# Patient Record
Sex: FEMALE | Race: WHITE
[De-identification: names, ages, dates, MRNs, and addresses within clinical notes are randomized per-mention and may not be internally consistent; named-entity substitution may affect disease eponyms.]

---

## 2019-10-23 ENCOUNTER — HOSPITAL ENCOUNTER (OUTPATIENT)
Dept: HOSPITAL 53 - M OPP | Age: 64
Discharge: HOME | End: 2019-10-23
Attending: INTERNAL MEDICINE
Payer: COMMERCIAL

## 2019-10-23 VITALS — WEIGHT: 177 LBS | BODY MASS INDEX: 30.22 KG/M2 | HEIGHT: 64 IN

## 2019-10-23 VITALS — DIASTOLIC BLOOD PRESSURE: 90 MMHG | SYSTOLIC BLOOD PRESSURE: 143 MMHG

## 2019-10-23 DIAGNOSIS — K64.0: ICD-10-CM

## 2019-10-23 DIAGNOSIS — Z12.11: Primary | ICD-10-CM

## 2019-10-23 DIAGNOSIS — K57.30: ICD-10-CM

## 2019-10-23 DIAGNOSIS — Z79.891: ICD-10-CM

## 2019-10-23 DIAGNOSIS — Z79.899: ICD-10-CM

## 2019-10-23 DIAGNOSIS — I48.91: ICD-10-CM

## 2019-10-23 DIAGNOSIS — R13.10: ICD-10-CM

## 2020-06-29 ENCOUNTER — HOSPITAL ENCOUNTER (OUTPATIENT)
Dept: HOSPITAL 53 - M ADAMS | Age: 65
End: 2020-06-29
Attending: NURSE PRACTITIONER
Payer: OTHER GOVERNMENT

## 2020-06-29 DIAGNOSIS — R06.02: Primary | ICD-10-CM

## 2022-05-17 ENCOUNTER — APPOINTMENT (EMERGENCY)
Dept: CT IMAGING | Facility: HOSPITAL | Age: 67
End: 2022-05-17
Payer: MEDICARE

## 2022-05-17 ENCOUNTER — APPOINTMENT (EMERGENCY)
Dept: RADIOLOGY | Facility: HOSPITAL | Age: 67
End: 2022-05-17
Payer: MEDICARE

## 2022-05-17 ENCOUNTER — HOSPITAL ENCOUNTER (OUTPATIENT)
Facility: HOSPITAL | Age: 67
Setting detail: OBSERVATION
Discharge: HOME | End: 2022-05-18
Attending: EMERGENCY MEDICINE | Admitting: STUDENT IN AN ORGANIZED HEALTH CARE EDUCATION/TRAINING PROGRAM
Payer: MEDICARE

## 2022-05-17 DIAGNOSIS — R07.9 CHEST PAIN: Primary | ICD-10-CM

## 2022-05-17 PROBLEM — E87.1 HYPONATREMIA: Status: ACTIVE | Noted: 2022-05-17

## 2022-05-17 PROBLEM — F41.9 ANXIETY: Status: ACTIVE | Noted: 2022-05-17

## 2022-05-17 PROBLEM — I10 HYPERTENSION: Status: ACTIVE | Noted: 2022-05-17

## 2022-05-17 PROBLEM — E78.5 HYPERLIPIDEMIA: Status: ACTIVE | Noted: 2022-05-17

## 2022-05-17 LAB
ALBUMIN SERPL BCP-MCNC: 3.9 G/DL (ref 3.5–5)
ALP SERPL-CCNC: 100 U/L (ref 46–116)
ALT SERPL W P-5'-P-CCNC: 25 U/L (ref 12–78)
ANION GAP SERPL CALCULATED.3IONS-SCNC: 9 MMOL/L (ref 4–13)
AST SERPL W P-5'-P-CCNC: 26 U/L (ref 5–45)
ATRIAL RATE: 89 BPM
ATRIAL RATE: 90 BPM
ATRIAL RATE: 92 BPM
BASOPHILS # BLD AUTO: 0.03 THOUSANDS/ΜL (ref 0–0.1)
BASOPHILS NFR BLD AUTO: 0 % (ref 0–1)
BILIRUB DIRECT SERPL-MCNC: 0.08 MG/DL (ref 0–0.2)
BILIRUB SERPL-MCNC: 0.31 MG/DL (ref 0.2–1)
BUN SERPL-MCNC: 16 MG/DL (ref 5–25)
CALCIUM SERPL-MCNC: 9 MG/DL (ref 8.3–10.1)
CARDIAC TROPONIN I PNL SERPL HS: <2 NG/L
CHLORIDE SERPL-SCNC: 101 MMOL/L (ref 100–108)
CO2 SERPL-SCNC: 25 MMOL/L (ref 21–32)
CREAT SERPL-MCNC: 0.84 MG/DL (ref 0.6–1.3)
EOSINOPHIL # BLD AUTO: 0.11 THOUSAND/ΜL (ref 0–0.61)
EOSINOPHIL NFR BLD AUTO: 1 % (ref 0–6)
ERYTHROCYTE [DISTWIDTH] IN BLOOD BY AUTOMATED COUNT: 12.4 % (ref 11.6–15.1)
FLUAV RNA RESP QL NAA+PROBE: NEGATIVE
FLUBV RNA RESP QL NAA+PROBE: NEGATIVE
GFR SERPL CREATININE-BSD FRML MDRD: 73 ML/MIN/1.73SQ M
GLUCOSE SERPL-MCNC: 108 MG/DL (ref 65–140)
HCT VFR BLD AUTO: 39.6 % (ref 34.8–46.1)
HGB BLD-MCNC: 13.3 G/DL (ref 11.5–15.4)
IMM GRANULOCYTES # BLD AUTO: 0.03 THOUSAND/UL (ref 0–0.2)
IMM GRANULOCYTES NFR BLD AUTO: 0 % (ref 0–2)
INR PPP: 0.89 (ref 0.84–1.19)
LYMPHOCYTES # BLD AUTO: 2.27 THOUSANDS/ΜL (ref 0.6–4.47)
LYMPHOCYTES NFR BLD AUTO: 27 % (ref 14–44)
MCH RBC QN AUTO: 34 PG (ref 26.8–34.3)
MCHC RBC AUTO-ENTMCNC: 33.6 G/DL (ref 31.4–37.4)
MCV RBC AUTO: 101 FL (ref 82–98)
MONOCYTES # BLD AUTO: 0.52 THOUSAND/ΜL (ref 0.17–1.22)
MONOCYTES NFR BLD AUTO: 6 % (ref 4–12)
NEUTROPHILS # BLD AUTO: 5.62 THOUSANDS/ΜL (ref 1.85–7.62)
NEUTS SEG NFR BLD AUTO: 66 % (ref 43–75)
NRBC BLD AUTO-RTO: 0 /100 WBCS
P AXIS: 61 DEGREES
P AXIS: 61 DEGREES
P AXIS: 66 DEGREES
PLATELET # BLD AUTO: 299 THOUSANDS/UL (ref 149–390)
PMV BLD AUTO: 9.7 FL (ref 8.9–12.7)
POTASSIUM SERPL-SCNC: 4.4 MMOL/L (ref 3.5–5.3)
PR INTERVAL: 124 MS
PR INTERVAL: 128 MS
PR INTERVAL: 130 MS
PROT SERPL-MCNC: 7.4 G/DL (ref 6.4–8.2)
PROTHROMBIN TIME: 11.7 SECONDS (ref 11.6–14.5)
QRS AXIS: -10 DEGREES
QRS AXIS: -4 DEGREES
QRS AXIS: 0 DEGREES
QRSD INTERVAL: 72 MS
QRSD INTERVAL: 74 MS
QRSD INTERVAL: 76 MS
QT INTERVAL: 350 MS
QT INTERVAL: 354 MS
QT INTERVAL: 356 MS
QTC INTERVAL: 430 MS
QTC INTERVAL: 432 MS
QTC INTERVAL: 435 MS
RBC # BLD AUTO: 3.91 MILLION/UL (ref 3.81–5.12)
RSV RNA RESP QL NAA+PROBE: NEGATIVE
SARS-COV-2 RNA RESP QL NAA+PROBE: NEGATIVE
SODIUM SERPL-SCNC: 135 MMOL/L (ref 136–145)
T WAVE AXIS: 15 DEGREES
T WAVE AXIS: 40 DEGREES
T WAVE AXIS: 42 DEGREES
VENTRICULAR RATE: 89 BPM
VENTRICULAR RATE: 90 BPM
VENTRICULAR RATE: 92 BPM
WBC # BLD AUTO: 8.58 THOUSAND/UL (ref 4.31–10.16)

## 2022-05-17 PROCEDURE — 93005 ELECTROCARDIOGRAM TRACING: CPT

## 2022-05-17 PROCEDURE — 99285 EMERGENCY DEPT VISIT HI MDM: CPT

## 2022-05-17 PROCEDURE — 93010 ELECTROCARDIOGRAM REPORT: CPT | Performed by: INTERNAL MEDICINE

## 2022-05-17 PROCEDURE — G1004 CDSM NDSC: HCPCS

## 2022-05-17 PROCEDURE — 80048 BASIC METABOLIC PNL TOTAL CA: CPT | Performed by: EMERGENCY MEDICINE

## 2022-05-17 PROCEDURE — 71275 CT ANGIOGRAPHY CHEST: CPT

## 2022-05-17 PROCEDURE — 71045 X-RAY EXAM CHEST 1 VIEW: CPT

## 2022-05-17 PROCEDURE — 80076 HEPATIC FUNCTION PANEL: CPT | Performed by: EMERGENCY MEDICINE

## 2022-05-17 PROCEDURE — 85025 COMPLETE CBC W/AUTO DIFF WBC: CPT | Performed by: EMERGENCY MEDICINE

## 2022-05-17 PROCEDURE — 85610 PROTHROMBIN TIME: CPT | Performed by: EMERGENCY MEDICINE

## 2022-05-17 PROCEDURE — 0241U HB NFCT DS VIR RESP RNA 4 TRGT: CPT | Performed by: EMERGENCY MEDICINE

## 2022-05-17 PROCEDURE — 74174 CTA ABD&PLVS W/CONTRAST: CPT

## 2022-05-17 PROCEDURE — 1123F ACP DISCUSS/DSCN MKR DOCD: CPT | Performed by: EMERGENCY MEDICINE

## 2022-05-17 PROCEDURE — 99220 PR INITIAL OBSERVATION CARE/DAY 70 MINUTES: CPT | Performed by: INTERNAL MEDICINE

## 2022-05-17 PROCEDURE — 99285 EMERGENCY DEPT VISIT HI MDM: CPT | Performed by: EMERGENCY MEDICINE

## 2022-05-17 PROCEDURE — 36415 COLL VENOUS BLD VENIPUNCTURE: CPT | Performed by: EMERGENCY MEDICINE

## 2022-05-17 PROCEDURE — 84484 ASSAY OF TROPONIN QUANT: CPT | Performed by: EMERGENCY MEDICINE

## 2022-05-17 RX ORDER — ESCITALOPRAM OXALATE 20 MG/1
200 TABLET ORAL
COMMUNITY

## 2022-05-17 RX ORDER — LISINOPRIL 20 MG/1
20 TABLET ORAL
COMMUNITY

## 2022-05-17 RX ORDER — HYDROCODONE BITARTRATE AND ACETAMINOPHEN 5; 325 MG/1; MG/1
1 TABLET ORAL DAILY PRN
COMMUNITY

## 2022-05-17 RX ORDER — LISINOPRIL 20 MG/1
20 TABLET ORAL
Status: DISCONTINUED | OUTPATIENT
Start: 2022-05-17 | End: 2022-05-18

## 2022-05-17 RX ORDER — MELATONIN
2000
COMMUNITY

## 2022-05-17 RX ORDER — ENOXAPARIN SODIUM 100 MG/ML
40 INJECTION SUBCUTANEOUS DAILY
Status: DISCONTINUED | OUTPATIENT
Start: 2022-05-18 | End: 2022-05-18 | Stop reason: HOSPADM

## 2022-05-17 RX ORDER — QUETIAPINE FUMARATE 400 MG/1
400 TABLET, FILM COATED ORAL
COMMUNITY

## 2022-05-17 RX ORDER — SODIUM CHLORIDE 9 MG/ML
3 INJECTION INTRAVENOUS
Status: DISCONTINUED | OUTPATIENT
Start: 2022-05-17 | End: 2022-05-18 | Stop reason: HOSPADM

## 2022-05-17 RX ORDER — ATORVASTATIN CALCIUM 40 MG/1
40 TABLET, FILM COATED ORAL
Status: DISCONTINUED | OUTPATIENT
Start: 2022-05-17 | End: 2022-05-18 | Stop reason: HOSPADM

## 2022-05-17 RX ORDER — ROSUVASTATIN CALCIUM 20 MG/1
20 TABLET, COATED ORAL
COMMUNITY

## 2022-05-17 RX ORDER — QUETIAPINE FUMARATE 100 MG/1
400 TABLET, FILM COATED ORAL
Status: DISCONTINUED | OUTPATIENT
Start: 2022-05-17 | End: 2022-05-18 | Stop reason: HOSPADM

## 2022-05-17 RX ADMIN — ATORVASTATIN CALCIUM 40 MG: 40 TABLET, FILM COATED ORAL at 17:09

## 2022-05-17 RX ADMIN — LISINOPRIL 20 MG: 20 TABLET ORAL at 21:22

## 2022-05-17 RX ADMIN — IOHEXOL 100 ML: 350 INJECTION, SOLUTION INTRAVENOUS at 11:06

## 2022-05-17 RX ADMIN — QUETIAPINE FUMARATE 400 MG: 100 TABLET ORAL at 21:23

## 2022-05-17 NOTE — PLAN OF CARE
Problem: Potential for Falls  Goal: Patient will remain free of falls  Description: INTERVENTIONS:  - Educate patient/family on patient safety including physical limitations  - Instruct patient to call for assistance with activity   - Consult OT/PT to assist with strengthening/mobility   - Keep Call bell within reach  - Keep bed low and locked with side rails adjusted as appropriate  - Keep care items and personal belongings within reach  - Initiate and maintain comfort rounds  - Make Fall Risk Sign visible to staff  - Offer Toileting every  Hours, in advance of need  - Initiate/Maintain alarm  - Obtain necessary fall risk management equipment:   - Apply yellow socks and bracelet for high fall risk patients  - Consider moving patient to room near nurses station  5/17/2022 1758 by Mei Garcia RN  Outcome: Progressing  5/17/2022 1755 by Mei Garcia RN  Outcome: Progressing     Problem: PAIN - ADULT  Goal: Verbalizes/displays adequate comfort level or baseline comfort level  Description: Interventions:  - Encourage patient to monitor pain and request assistance  - Assess pain using appropriate pain scale  - Administer analgesics based on type and severity of pain and evaluate response  - Implement non-pharmacological measures as appropriate and evaluate response  - Consider cultural and social influences on pain and pain management  - Notify physician/advanced practitioner if interventions unsuccessful or patient reports new pain  5/17/2022 1758 by Mei Garcia RN  Outcome: Progressing  5/17/2022 1755 by Mei Garcia, RN  Outcome: Progressing     Problem: INFECTION - ADULT  Goal: Absence or prevention of progression during hospitalization  Description: INTERVENTIONS:  - Assess and monitor for signs and symptoms of infection  - Monitor lab/diagnostic results  - Monitor all insertion sites, i e  indwelling lines, tubes, and drains  - Monitor endotracheal if appropriate and nasal secretions for changes in amount and color  - [place] appropriate cooling/warming therapies per order  - Administer medications as ordered  - Instruct and encourage patient and family to use good hand hygiene technique  - Identify and instruct in appropriate isolation precautions for identified infection/condition  5/17/2022 1758 by Dang Abraham RN  Outcome: Progressing  5/17/2022 1755 by Dang Abraham RN  Outcome: Progressing  Goal: Absence of fever/infection during neutropenic period  Description: INTERVENTIONS:  - Monitor WBC    5/17/2022 1758 by Dang Abraham RN  Outcome: Progressing  5/17/2022 1755 by Dang Abraham RN  Outcome: Progressing     Problem: SAFETY ADULT  Goal: Patient will remain free of falls  Description: INTERVENTIONS:  - Educate patient/family on patient safety including physical limitations  - Instruct patient to call for assistance with activity   - Consult OT/PT to assist with strengthening/mobility   - Keep Call bell within reach  - Keep bed low and locked with side rails adjusted as appropriate  - Keep care items and personal belongings within reach  - Initiate and maintain comfort rounds  - Make Fall Risk Sign visible to staff  - Offer Toileting every  Hours, in advance of need  - Initiate/Maintain alarm  - Obtain necessary fall risk management equipment:   - Apply yellow socks and bracelet for high fall risk patients  - Consider moving patient to room near nurses station  5/17/2022 1758 by Dang Abraham RN  Outcome: Progressing  5/17/2022 1755 by Dang Abraham RN  Outcome: Progressing  Goal: Maintain or return to baseline ADL function  Description: INTERVENTIONS:  -  Assess patient's ability to carry out ADLs; assess patient's baseline for ADL function and identify physical deficits which impact ability to perform ADLs (bathing, care of mouth/teeth, toileting, grooming, dressing, etc )  - Assess/evaluate cause of self-care deficits   - Assess range of motion  - Assess patient's mobility; develop plan if impaired  - Assess patient's need for assistive devices and provide as appropriate  - Encourage maximum independence but intervene and supervise when necessary  - Involve family in performance of ADLs  - Assess for home care needs following discharge   - Consider OT consult to assist with ADL evaluation and planning for discharge  - Provide patient education as appropriate  5/17/2022 1758 by Priya Lewis RN  Outcome: Progressing  5/17/2022 1755 by Priya Lewis RN  Outcome: Progressing  Goal: Maintains/Returns to pre admission functional level  Description: INTERVENTIONS:  - Perform BMAT or MOVE assessment daily    - Set and communicate daily mobility goal to care team and patient/family/caregiver  - Collaborate with rehabilitation services on mobility goals if consulted  - Perform Range of Motion  times a day  - Reposition patient every  hours    - Dangle patient times a day  - Stand patient  times a day  - Ambulate patient  times a day  - Out of bed to chair times a day   - Out of bed for meals times a day  - Out of bed for toileting  - Record patient progress and toleration of activity level   5/17/2022 1758 by Priya Lewis RN  Outcome: Progressing  5/17/2022 1755 by Priya Lewis RN  Outcome: Progressing     Problem: DISCHARGE PLANNING  Goal: Discharge to home or other facility with appropriate resources  Description: INTERVENTIONS:  - Identify barriers to discharge w/patient and caregiver  - Arrange for needed discharge resources and transportation as appropriate  - Identify discharge learning needs (meds, wound care, etc )  - Arrange for interpretive services to assist at discharge as needed  - Refer to Case Management Department for coordinating discharge planning if the patient needs post-hospital services based on physician/advanced practitioner order or complex needs related to functional status, cognitive ability, or social support system  5/17/2022 1758 by Priya Lewis RN  Outcome: Progressing  5/17/2022 1755 by Elisa Potts RN  Outcome: Progressing     Problem: Knowledge Deficit  Goal: Patient/family/caregiver demonstrates understanding of disease process, treatment plan, medications, and discharge instructions  Description: Complete learning assessment and assess knowledge base    Interventions:  - Provide teaching at level of understanding  - Provide teaching via preferred learning methods  5/17/2022 1758 by Elisa Potts RN  Outcome: Progressing  5/17/2022 1755 by Elisa Potts RN  Outcome: Progressing

## 2022-05-17 NOTE — ASSESSMENT & PLAN NOTE
Stable    On escitalopram 200 mg qHS PRN and Quetiapine 400 mg qHS  Continue quetiapine 400 mg for now  Continue outpatient follow-up with primary psychiatrist

## 2022-05-17 NOTE — ASSESSMENT & PLAN NOTE
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                   Subjective: Cris Gomez arrived with her mother to physical therapy today  Cris Gomez was fitted for a DMO last Tuesday (18) with Pat Noyola at [address]  Batool received Botox injections yesterday with Dr Matilda Kang at [facility] in her hip adductors and her spinal extensors, and has a follow-up appointment scheduled with Dr Matilda Kang in 6 weeks  Objective:  - Tone management techniques utilized in today's session with proprioceptive input and joint compressions, and reciprocal passive movements of UE and LE  Full body flexion incorporated during periods of significant extensor tone  - Manual stretching of all LE joints at beginning of session  Focus on hip adductor stretching today in supported sitting and supine   - Seated balance in prop sitting with knees in extension  Maintain 5-10 seconds independently before lateral collapse, no lateral protective reactions observed  Kyphotic posture throughout, but minimal response to sound toys to lift head to 90 degrees  Preference for left cervical rotation   - Fit and assessed Batool in a Novap[employer] Pacer gait   Ambulation throughout clinic on carpet and hardwood floors  Batool advancing LE independently for maximally 6 steps, all other steps requiring mod-maximum assistance  Bilateral toe drag LLE > RLE throughout    - Trial and adaption of circular versus linear arm supports on Pacer  Lower positioning of handhold promoted less trunk and neck extension   - Straddle sitting on therapist's lap, therapist providing maxA to pull vibrating elastic toy in inferior direction, moderate assistance to return toy in superior direction back to initial position   Repeat bilateral   - Static stance in Novapost Pacer with Reports substernal chest pain  Per patient, this is the 2nd episode   Nonexertional  Radiates to both arms and back  Family history of heart attacks in mother  Grade 2 systolic murmur in the RUSB otherwise normal  EKG normal on presentation  CTA negative for dissection/aneurysm  Troponin x1 negative  Will admit for observation and telemetry  Will obtain 2D Echo  therapist and mother engaging with Batool using verbal cues, to help engage visual focus and upright and midline head positioning   - Rolling from supine to right sidelying independent, moderate assistance to complete roll from sidelying into prone   - Holding quadruped with maxA to achieve, therapist providing anterior-posterior rocking to promote increased weight shift in functional position  Hold for bursts of 10-20 second with Bonilla at hips before collapse  Assessment: Tolerated treatment well  Patient would benefit from continued PT  [place] Sav had an absolutely wonderful session today  Batool received her second round of Botox in her hip adductors, and first round of Botox in her paraspinal extensors  The therapist will continue to focus on stretching of these muscles specifically in the next few weeks during the peak effect of Botox, to help give Batool the greatest potential for range of motion improvements  Baron Ang had a preference throughout 50-75% of today's session to maintain her neck in a position of left rotation and extension  The therapist had difficulty determining if this preference was due to a visual focus on objects in the room or due to increased extensor tone  Today was the first session that Baron Ang practiced ambulation in a gait   She was very cooperative and pleasant when in the Pacer, and thoroughly enjoyed being at an age-appropriate level to physically be able to interact with her peers  The saddle strap on the Pacer helped to prevent any LE scissoring, but Batool was limited with greater independence to advance her feet for greater than 6 steps without dragging her toes  Baron Ang did well with holding onto the frame of the Pacer, and the therapist plans to trial the horizontal bars for hand hold next session  Baron Ang did experience early LE fatigue with ambulation in the gait , but overall had no fussing despite being in a new piece of medical equipment for nearly 30 minutes in today's session  Susu Medel appeared to have greater visual focus on the therapist when the therapist directly interacted with Susu Medel from a one-foot distance, specifically with Batool's right eye  Plan: Continue per plan of care

## 2022-05-17 NOTE — ED PROVIDER NOTES
History  Chief Complaint   Patient presents with    Chest Pain     Patient states she started with chest pain that started this morning around 7:30 am   Patient states the pain intermittently radiates to her back  HPI patient is 60-year-old female, I received medical command from EMS apparently at a urgent care with chest pain patient describes as an uncomfortable sensation which radiates down both arms and radiates to her back  Patient reports several months ago she had a similar episode only lasted a few seconds and then seemed to resolve  Patient reports today she had a persistent episode went to the urgent care  Patient was given aspirin and nitroglycerin at the urgent care  Reports some relief  She reports the pain seemed to be episodic and does not seem to be present at this time  EKG showed nonspecific ST-T wave changes at the urgent care and patient was brought in by EMS  EMS reports no IV access, patient seemed to have relief of her pain with time  Thirty EKG showed no acute changes  Past medical history  Family history  Social history visiting the area playing [employer]    Prior to Admission Medications   Prescriptions Last Dose Informant Patient Reported? Taking? HYDROcodone-acetaminophen (NORCO) 5-325 mg per tablet   Yes Yes   Sig: Take 1 tablet by mouth as needed in the morning for pain  QUEtiapine (SEROquel) 400 MG tablet   Yes Yes   Sig: Take 400 mg by mouth daily at bedtime   cholecalciferol (VITAMIN D3) 1,000 units tablet   Yes Yes   Sig: Take 2,000 Units by mouth daily at bedtime   escitalopram (LEXAPRO) 20 mg tablet   Yes Yes   Sig: Take 200 mg by mouth daily at bedtime as needed   lisinopril (ZESTRIL) 20 mg tablet   Yes Yes   Sig: Take 20 mg by mouth daily at bedtime   loratadine-pseudoephedrine (CLARITIN-D 12-HOUR) 5-120 mg per tablet   Yes Yes   Sig: Take 1 tablet by mouth as needed in the morning for allergies     rosuvastatin (CRESTOR) 20 MG tablet   Yes Yes   Sig: Take 20 mg by mouth daily at bedtime      Facility-Administered Medications: None       Past Medical History:   Diagnosis Date    Hypercholesteremia     Hypertension        No past surgical history on file  No family history on file  I have reviewed and agree with the history as documented  E-Cigarette/Vaping     E-Cigarette/Vaping Substances          Review of Systems   Constitutional: Negative for diaphoresis, fatigue and fever  HENT: Negative for congestion, ear pain, nosebleeds and sore throat  Eyes: Negative for photophobia, pain, discharge and visual disturbance  Respiratory: Negative for cough, choking, chest tightness, shortness of breath and wheezing  Cardiovascular: Positive for chest pain  Negative for palpitations  Gastrointestinal: Negative for abdominal distention, abdominal pain, diarrhea and vomiting  Genitourinary: Negative for dysuria, flank pain and frequency  Musculoskeletal: Negative for back pain, gait problem and joint swelling  Skin: Negative for color change and rash  Neurological: Negative for dizziness, syncope and headaches  Psychiatric/Behavioral: Negative for behavioral problems and confusion  The patient is not nervous/anxious  All other systems reviewed and are negative  Physical Exam  Physical Exam  Vitals and nursing note reviewed  Constitutional:       Appearance: She is well-developed  HENT:      Head: Normocephalic  Right Ear: External ear normal       Left Ear: External ear normal       Nose: Nose normal    Eyes:      General: Lids are normal       Pupils: Pupils are equal, round, and reactive to light  Cardiovascular:      Rate and Rhythm: Normal rate and regular rhythm  Pulses: Normal pulses  Heart sounds: Normal heart sounds  Pulmonary:      Effort: Pulmonary effort is normal  No respiratory distress  Breath sounds: Normal breath sounds  Abdominal:      General: Abdomen is flat   Bowel sounds are normal  Tenderness: There is no abdominal tenderness  Musculoskeletal:         General: No deformity  Normal range of motion  Cervical back: Normal range of motion and neck supple  Skin:     General: Skin is warm and dry  Neurological:      Mental Status: She is alert and oriented to person, place, and time       Pulse oximetry normal 96% adequate oxygenation, there is no hypoxia    Vital Signs  ED Triage Vitals   Temperature Pulse Respirations Blood Pressure SpO2   05/17/22 1136 05/17/22 1102 05/17/22 1102 05/17/22 1102 05/17/22 1102   98 1 °F (36 7 °C) 92 18 154/78 96 %      Temp Source Heart Rate Source Patient Position - Orthostatic VS BP Location FiO2 (%)   05/17/22 1136 05/17/22 1102 05/17/22 1130 05/17/22 1130 --   Oral Monitor Sitting Right arm       Pain Score       --                  Vitals:    05/17/22 1130 05/17/22 1230 05/17/22 1330 05/17/22 1430   BP: 167/80 143/85 131/90 142/90   Pulse: 93 87 92 96   Patient Position - Orthostatic VS: Sitting Sitting Sitting Sitting         Visual Acuity      ED Medications  Medications   sodium chloride (PF) 0 9 % injection 3 mL (has no administration in time range)   iohexol (OMNIPAQUE) 350 MG/ML injection (SINGLE-DOSE) 100 mL (100 mL Intravenous Given 5/17/22 1106)       Diagnostic Studies  Results Reviewed     Procedure Component Value Units Date/Time    HS Troponin I 4hr [[id number]] Collected: 05/17/22 1508    Lab Status: Final result Specimen: Blood from Arm, Right Updated: 05/17/22 1547     hs TnI 4hr <2 ng/L      Delta 4hr hsTnI --    HS Troponin I 2hr [[id number]] Collected: 05/17/22 1336    Lab Status: Final result Specimen: Blood from Arm, Right Updated: 05/17/22 1406     hs TnI 2hr <2 ng/L      Delta 2hr hsTnI --    COVID/FLU/RSV - 2 hour TAT [[id number]]  (Normal) Collected: 05/17/22 1228    Lab Status: Final result Specimen: Nares from Nose Updated: 05/17/22 1336     SARS-CoV-2 Negative     INFLUENZA A PCR Negative     INFLUENZA B PCR Negative     RSV PCR Negative    Narrative:      FOR PEDIATRIC PATIENTS - copy/paste COVID Guidelines URL to browser: [email or web address] [email or web address]  ashx    SARS-CoV-2 assay is a Nucleic Acid Amplification assay intended for the  qualitative detection of nucleic acid from SARS-CoV-2 in nasopharyngeal  swabs  Results are for the presumptive identification of SARS-CoV-2 RNA  Positive results are indicative of infection with SARS-CoV-2, the virus  causing COVID-19, but do not rule out bacterial infection or co-infection  with other viruses  Laboratories within the [place] and its  territories are required to report all positive results to the appropriate  public health authorities  Negative results do not preclude SARS-CoV-2  infection and should not be used as the sole basis for treatment or other  patient management decisions  Negative results must be combined with  clinical observations, patient history, and epidemiological information  This test has not been FDA cleared or approved  This test has been authorized by FDA under an Emergency Use Authorization  (EUA)  This test is only authorized for the duration of time the  declaration that circumstances exist justifying the authorization of the  emergency use of an in vitro diagnostic tests for detection of SARS-CoV-2  virus and/or diagnosis of COVID-19 infection under section 564(b)(1) of  the Act, 21 U  S C  738KOK-7(W)(7), unless the authorization is terminated  or revoked sooner  The test has been validated but independent review by FDA  and CLIA is pending  Test performed using [employer] Gene[employer]: This RT-PCR assay targets N2,  a region unique to SARS-CoV-2  A conserved region in the E-gene was chosen  for pan-Sarbecovirus detection which includes SARS-CoV-2      HS Troponin 0hr (reflex protocol) [[id number]]  (Normal) Collected: 05/17/22 1103    Lab Status: Final result Specimen: Blood from Arm, Right Updated: 05/17/22 1203 hs TnI 0hr <2 ng/L     Hepatic function panel [[id number]]  (Normal) Collected: 05/17/22 1103    Lab Status: Final result Specimen: Blood from Arm, Right Updated: 05/17/22 1206     Total Bilirubin 0 31 mg/dL      Bilirubin, Direct 0 08 mg/dL      Alkaline Phosphatase 100 U/L      AST 26 U/L      ALT 25 U/L      Total Protein 7 4 g/dL      Albumin 3 9 g/dL     Basic metabolic panel [[id number]]  (Abnormal) Collected: 05/17/22 1103    Lab Status: Final result Specimen: Blood from Arm, Right Updated: 05/17/22 1156     Sodium 135 mmol/L      Potassium 4 4 mmol/L      Chloride 101 mmol/L      CO2 25 mmol/L      ANION GAP 9 mmol/L      BUN 16 mg/dL      Creatinine 0 84 mg/dL      Glucose 108 mg/dL      Calcium 9 0 mg/dL      eGFR 73 ml/min/1 73sq m     Narrative:      [place] guidelines for Chronic Kidney Disease (CKD):     Stage 1 with normal or high GFR (GFR > 90 mL/min/1 73 square meters)    Stage 2 Mild CKD (GFR = 60-89 mL/min/1 73 square meters)    Stage 3A Moderate CKD (GFR = 45-59 mL/min/1 73 square meters)    Stage 3B Moderate CKD (GFR = 30-44 mL/min/1 73 square meters)    Stage 4 Severe CKD (GFR = 15-29 mL/min/1 73 square meters)    Stage 5 End Stage CKD (GFR <15 mL/min/1 73 square meters)  Note: GFR calculation is accurate only with a steady state creatinine    Protime-INR [[id number]]  (Normal) Collected: 05/17/22 1103    Lab Status: Final result Specimen: Blood from Arm, Right Updated: 05/17/22 1155     Protime 11 7 seconds      INR 0 89    CBC and differential [[id number]]  (Abnormal) Collected: 05/17/22 1103    Lab Status: Final result Specimen: Blood from Arm, Right Updated: 05/17/22 1146     WBC 8 58 Thousand/uL      RBC 3 91 Million/uL      Hemoglobin 13 3 g/dL      Hematocrit 39 6 %       fL      MCH 34 0 pg      MCHC 33 6 g/dL      RDW 12 4 %      MPV 9 7 fL      Platelets 516 Thousands/uL      nRBC 0 /100 WBCs      Neutrophils Relative 66 %      Immat GRANS % 0 % Lymphocytes Relative 27 %      Monocytes Relative 6 %      Eosinophils Relative 1 %      Basophils Relative 0 %      Neutrophils Absolute 5 62 Thousands/µL      Immature Grans Absolute 0 03 Thousand/uL      Lymphocytes Absolute 2 27 Thousands/µL      Monocytes Absolute 0 52 Thousand/µL      Eosinophils Absolute 0 11 Thousand/µL      Basophils Absolute 0 03 Thousands/µL                  CTA chest abdomen pelvis w wo contrast (Dissection)   Final Result by Kieran Dempsey MD (05/17 3491)      No acute abnormality identified in the chest abdomen or pelvis  Vascular structures are unremarkable  Workstation performed: [id number]         X-ray chest 1 view portable   Final Result by Veronique Morataya MD (05/17 0672)      No acute cardiopulmonary disease  Workstation performed: [id number]                    Procedures  ECG 12 Lead Documentation Only    Date/Time: 5/17/2022 10:51 AM  Performed by: Maritza Miner MD  Authorized by: Maritza Miner MD     Indications / Diagnosis:  Chest pain  ECG reviewed by me, the ED Provider: yes    Patient location:  ED  Previous ECG:     Previous ECG:  Unavailable  Interpretation:     Interpretation: non-specific    Rate:     ECG rate:  Ninety  Rhythm:     Rhythm: sinus rhythm    Comments:      Normal sinus rhythm no acute ST-T wave changes poor anterior forces             ED Course            Chest x-ray: Chest x-ray showed a normal cardiac silhouette, no pneumothorax no infiltrates, No sign of pathology, interpreted by me, I was the primary   CT scan for dissection required because the patient had severe pain which radiates to the back showed no acute dissection no vascular injury      HEART Risk Score    Flowsheet Row Most Recent Value   Heart Score Risk Calculator    History 2 Filed at: 05/17/2022 1211   ECG 0 Filed at: 05/17/2022 1211   Age 2 Filed at: 05/17/2022 1211   Risk Factors 2 Filed at: 05/17/2022 1211   Troponin 0 Filed at: 05/17/2022 1211   HEART Score 6 Filed at: 05/17/2022 1211            Patient presented with chest pain which radiated to her back dissection study was performed, it showed no dissection  SBIRT 22yo+    Flowsheet Row Most Recent Value   SBIRT (23 yo +)    In order to provide better care to our patients, we are screening all of our patients for alcohol and drug use  Would it be okay to ask you these screening questions? Yes Filed at: 05/17/2022 1109   Initial Alcohol Screen: US AUDIT-C     1  How often do you have a drink containing alcohol? 0 Filed at: 05/17/2022 1109   2  How many drinks containing alcohol do you have on a typical day you are drinking? 0 Filed at: 05/17/2022 1109   3b  FEMALE Any Age, or MALE 65+: How often do you have 4 or more drinks on one occassion? 0 Filed at: 05/17/2022 1109   Audit-C Score 0 Filed at: 05/17/2022 1109   MIKA: How many times in the past year have you    Used an illegal drug or used a prescription medication for non-medical reasons? Never Filed at: 05/17/2022 1109           initial cardiac troponin was negative  COVID testing was negative     liver functions were normal no sign of hepatitis  White count was normal no sign of inflammation  Hemoglobin was normal no sign of anemia        MDM medical decision making 55-year-old female presents emergency department with significant anterior chest pain radiating to her back there was concern for dissection so CT scan for dissection was done it was negative  Patient had a nonspecific EKG cardiac troponin was negative  Discussed with patient, she has advised heart score believe she requires observation and serial troponins she agreed  Patient was discussed with hospitalist service    Stable at the time of observation    Disposition  Final diagnoses:   Chest pain     Time reflects when diagnosis was documented in both MDM as applicable and the Disposition within this note     Time User Action Codes Description Comment    5/17/2022 12:50 PM Mirtane Sangita Add [R07 9] Chest pain       ED Disposition     ED Disposition   Admit    Condition   Stable    Date/Time   Tue May 17, 2022 12:51 PM    Comment   Case was discussed with the hospitalist service and the patient's admission status was agreed to be observation status the service of Dr Harris         Follow-up Information    None         Patient's Medications   Discharge Prescriptions    No medications on file       No discharge procedures on file      PDMP Review     None          ED Provider  Electronically Signed by           Mark Bueno MD  05/17/22 9908

## 2022-05-17 NOTE — H&P
[facility]  H&P- Ryan Rose 1955, 73 y o  female MRN: [id number]  Unit/Bed#: ED 16 Encounter: [id number]  Primary Care Provider: No primary care provider on file  Date and time admitted to hospital: 5/17/2022 10:38 AM    * Chest pain  Assessment & Plan  Reports substernal chest pain  Per patient, this is the 2nd episode   Nonexertional  Radiates to both arms and back  Family history of heart attacks in mother  Grade 2 systolic murmur in the RUSB otherwise normal  EKG normal on presentation  CTA negative for dissection/aneurysm  Troponin x1 negative  Will admit for observation and telemetry  Will obtain 2D Echo  Hyponatremia  Assessment & Plan  Sodium of 135  Mild hyponatremia  Suspect secondary to dehydration  Encourage adequate oral hydration  Hypertension  Assessment & Plan  BP of 130/90  Continue lisinopril 10 mg daily    Anxiety and depression  Assessment & Plan  Stable  On escitalopram 200 mg qHS PRN and Quetiapine 400 mg qHS  Continue quetiapine 400 mg for now  Continue outpatient follow-up with primary psychiatrist    Hyperlipidemia  Assessment & Plan  Continue home Crestor 20 mg    VTE Prophylaxis: Enoxaparin (Lovenox)  / sequential compression device   Code Status:  Full code  POLST: There is no POLST form on file for this patient (pre-hospital)  Discussion with family:  None at bedside    Anticipated Length of Stay:  Patient will be admitted on an Observation basis with an anticipated length of stay of  < 2 midnights  Justification for Hospital Stay: Chest pain evaluation    Total Time for Visit, including Counseling / Coordination of Care: 45 minutes  Greater than 50% of this total time spent on direct patient counseling and coordination of care      Chief Complaint:   Chest Pain    History of Present Illness:    Ryan Rose is a 73 y o  female past medical history significant for hypertension , hyperlipidemia, anxiety and depression who presents with chest pain  She did state that this is the 2nd episode of chest pain  Chest pain is substernal, nonexertional, radiates to the back into the bilateral arms  She did report associated lightheadedness, dizziness and palpitation  No identifiable aggravating or relieving factor  For discharge continue urgent care where she was given aspirin and nitroglycerin which improved the chest pain  EKG done at the urgent care showed nonspecific ST/T-wave changes  Presentation to the ER, she was hemodynamically stable  However, EKG done in the ER showed NSR  CTA chest, abdomen and pelvis was negative for any dissection  Troponin x1 was negative  CMP and CBC were grossly unremarkable  Patient does state to have a maternal history of heart attack and congestive heart failure  Denies use of alcohol, tobacco or recreational drugs  No known drug allergies  Review of Systems:    Review of Systems   Constitutional: Negative for activity change, fatigue and fever  Respiratory: Negative for apnea, cough and shortness of breath  Cardiovascular: Positive for chest pain and palpitations  Gastrointestinal: Negative for abdominal distention, constipation and nausea  Genitourinary: Negative  Neurological: Positive for light-headedness  Negative for speech difficulty and weakness  Psychiatric/Behavioral: Negative for agitation and decreased concentration  The patient is not hyperactive  Past Medical and Surgical History:     Past Medical History:   Diagnosis Date    Hypercholesteremia     Hypertension        No past surgical history on file  Meds/Allergies:    Prior to Admission medications    Not on File     I have reviewed home medications with patient personally      Allergies: No Known Allergies    Social History:     Marital Status: /Civil Union   Occupation: Retired  Personnel  Patient Pre-hospital Living Situation: Lives at home  Patient Pre-hospital Level of Mobility: Self ambulating  Patient Pre-hospital Diet Restrictions:  None  Substance Use History:   Social History     Substance and Sexual Activity   Alcohol Use Not on file     Social History     Tobacco Use   Smoking Status Not on file   Smokeless Tobacco Not on file     Social History     Substance and Sexual Activity   Drug Use Not on file       Family History:    non-contributory    Physical Exam:     Vitals:   Blood Pressure: 142/90 (05/17/22 1430)  Pulse: 96 (05/17/22 1430)  Temperature: 98 1 °F (36 7 °C) (05/17/22 1136)  Temp Source: Oral (05/17/22 1136)  Respirations: 20 (05/17/22 1430)  Height: 5' (152 4 cm) (05/17/22 1102)  Weight - Scale: 78 5 kg (173 lb) (05/17/22 1102)  SpO2: 96 % (05/17/22 1430)    Physical Exam  Vitals and nursing note reviewed  Constitutional:       General: She is not in acute distress  Appearance: She is well-developed  She is not toxic-appearing  HENT:      Head: Normocephalic and atraumatic  Eyes:      Conjunctiva/sclera: Conjunctivae normal    Cardiovascular:      Rate and Rhythm: Normal rate and regular rhythm  Heart sounds: Murmur heard  Pulmonary:      Effort: Pulmonary effort is normal  No respiratory distress  Breath sounds: Normal breath sounds  Abdominal:      General: There is no distension  Palpations: Abdomen is soft  Tenderness: There is no abdominal tenderness  Musculoskeletal:      Cervical back: Neck supple  Skin:     General: Skin is warm and dry  Neurological:      Mental Status: She is alert and oriented to person, place, and time  Mental status is at baseline  Psychiatric:         Mood and Affect: Mood normal          Behavior: Behavior normal            Additional Data:     Lab Results: I have personally reviewed pertinent reports        Results from last 7 days   Lab Units 05/17/22  1103   WBC Thousand/uL 8 58   HEMOGLOBIN g/dL 13 3   HEMATOCRIT % 39 6   PLATELETS Thousands/uL 299   NEUTROS PCT % 66   LYMPHS PCT % 27   MONOS PCT % 6 EOS PCT % 1     Results from last 7 days   Lab Units 05/17/22  1103   SODIUM mmol/L 135*   POTASSIUM mmol/L 4 4   CHLORIDE mmol/L 101   CO2 mmol/L 25   BUN mg/dL 16   CREATININE mg/dL 0 84   ANION GAP mmol/L 9   CALCIUM mg/dL 9 0   ALBUMIN g/dL 3 9   TOTAL BILIRUBIN mg/dL 0 31   ALK PHOS U/L 100   ALT U/L 25   AST U/L 26   GLUCOSE RANDOM mg/dL 108     Results from last 7 days   Lab Units 05/17/22  1103   INR  0 89                   Imaging: I have personally reviewed pertinent reports  CTA chest abdomen pelvis w wo contrast (Dissection)   Final Result by Lona Pérez MD (05/17 5547)      No acute abnormality identified in the chest abdomen or pelvis  Vascular structures are unremarkable  Workstation performed: [id number]         X-ray chest 1 view portable   Final Result by Storm Chappell MD (05/17 9127)      No acute cardiopulmonary disease  Workstation performed: [id number]             EKG, Pathology, and Other Studies Reviewed on Admission:   · EKG: NSR, HR-95, RBBB,    Allscripts / Epic Records Reviewed: Yes     ** Please Note: This note has been constructed using a voice recognition system   **

## 2022-05-17 NOTE — ASSESSMENT & PLAN NOTE
Sodium of 135  Mild hyponatremia  Suspect secondary to dehydration  Encourage adequate oral hydration

## 2022-05-18 ENCOUNTER — APPOINTMENT (OUTPATIENT)
Dept: NON INVASIVE DIAGNOSTICS | Facility: HOSPITAL | Age: 67
End: 2022-05-18
Payer: MEDICARE

## 2022-05-18 VITALS
DIASTOLIC BLOOD PRESSURE: 77 MMHG | BODY MASS INDEX: 31.65 KG/M2 | TEMPERATURE: 98.1 F | SYSTOLIC BLOOD PRESSURE: 120 MMHG | HEART RATE: 81 BPM | RESPIRATION RATE: 18 BRPM | OXYGEN SATURATION: 91 % | WEIGHT: 172 LBS | HEIGHT: 62 IN

## 2022-05-18 LAB
ANION GAP SERPL CALCULATED.3IONS-SCNC: 7 MMOL/L (ref 4–13)
AORTIC ROOT: 3 CM
APICAL FOUR CHAMBER EJECTION FRACTION: 71 %
ASCENDING AORTA: 2.8 CM
AV PEAK GRADIENT: 13 MMHG
BASOPHILS # BLD AUTO: 0.01 THOUSANDS/ΜL (ref 0–0.1)
BASOPHILS NFR BLD AUTO: 0 % (ref 0–1)
BUN SERPL-MCNC: 15 MG/DL (ref 5–25)
CALCIUM SERPL-MCNC: 9.2 MG/DL (ref 8.3–10.1)
CHLORIDE SERPL-SCNC: 106 MMOL/L (ref 100–108)
CO2 SERPL-SCNC: 25 MMOL/L (ref 21–32)
CREAT SERPL-MCNC: 0.7 MG/DL (ref 0.6–1.3)
E WAVE DECELERATION TIME: 313 MS
EOSINOPHIL # BLD AUTO: 0.14 THOUSAND/ΜL (ref 0–0.61)
EOSINOPHIL NFR BLD AUTO: 3 % (ref 0–6)
ERYTHROCYTE [DISTWIDTH] IN BLOOD BY AUTOMATED COUNT: 12.7 % (ref 11.6–15.1)
FRACTIONAL SHORTENING: 36 % (ref 28–44)
GFR SERPL CREATININE-BSD FRML MDRD: 91 ML/MIN/1.73SQ M
GLUCOSE P FAST SERPL-MCNC: 116 MG/DL (ref 65–99)
GLUCOSE SERPL-MCNC: 116 MG/DL (ref 65–140)
HCT VFR BLD AUTO: 36.7 % (ref 34.8–46.1)
HGB BLD-MCNC: 12.2 G/DL (ref 11.5–15.4)
IMM GRANULOCYTES # BLD AUTO: 0.01 THOUSAND/UL (ref 0–0.2)
IMM GRANULOCYTES NFR BLD AUTO: 0 % (ref 0–2)
INTERVENTRICULAR SEPTUM IN DIASTOLE (PARASTERNAL SHORT AXIS VIEW): 0.9 CM
INTERVENTRICULAR SEPTUM: 0.9 CM (ref 0.6–1.1)
LA/AORTA RATIO 2D: 0.97
LAAS-AP2: 16 CM2
LAAS-AP4: 10.9 CM2
LEFT ATRIUM SIZE: 2.9 CM
LEFT INTERNAL DIMENSION IN SYSTOLE: 2.3 CM (ref 2.1–4)
LEFT VENTRICULAR INTERNAL DIMENSION IN DIASTOLE: 3.6 CM (ref 3.5–6)
LEFT VENTRICULAR POSTERIOR WALL IN END DIASTOLE: 1 CM
LEFT VENTRICULAR STROKE VOLUME: 35 ML
LVSV (TEICH): 35 ML
LYMPHOCYTES # BLD AUTO: 2.85 THOUSANDS/ΜL (ref 0.6–4.47)
LYMPHOCYTES NFR BLD AUTO: 51 % (ref 14–44)
MCH RBC QN AUTO: 34.8 PG (ref 26.8–34.3)
MCHC RBC AUTO-ENTMCNC: 33.2 G/DL (ref 31.4–37.4)
MCV RBC AUTO: 105 FL (ref 82–98)
MONOCYTES # BLD AUTO: 0.39 THOUSAND/ΜL (ref 0.17–1.22)
MONOCYTES NFR BLD AUTO: 7 % (ref 4–12)
MV E'TISSUE VEL-SEP: 7 CM/S
MV PEAK A VEL: 0.95 M/S
MV PEAK E VEL: 68 CM/S
MV STENOSIS PRESSURE HALF TIME: 92 MS
MV VALVE AREA P 1/2 METHOD: 2.39 CM2
NEUTROPHILS # BLD AUTO: 2.14 THOUSANDS/ΜL (ref 1.85–7.62)
NEUTS SEG NFR BLD AUTO: 39 % (ref 43–75)
NRBC BLD AUTO-RTO: 0 /100 WBCS
PLATELET # BLD AUTO: 240 THOUSANDS/UL (ref 149–390)
PMV BLD AUTO: 9.2 FL (ref 8.9–12.7)
POTASSIUM SERPL-SCNC: 4 MMOL/L (ref 3.5–5.3)
RBC # BLD AUTO: 3.51 MILLION/UL (ref 3.81–5.12)
SL CV LV EF: 60
SL CV PED ECHO LEFT VENTRICLE DIASTOLIC VOLUME (MOD BIPLANE) 2D: 53 ML
SL CV PED ECHO LEFT VENTRICLE SYSTOLIC VOLUME (MOD BIPLANE) 2D: 18 ML
SODIUM SERPL-SCNC: 138 MMOL/L (ref 136–145)
TR MAX PG: 23 MMHG
TR PEAK VELOCITY: 2.4 M/S
TRICUSPID ANNULAR PLANE SYSTOLIC EXCURSION: 2 CM
TRICUSPID VALVE PEAK REGURGITATION VELOCITY: 2.41 M/S
WBC # BLD AUTO: 5.54 THOUSAND/UL (ref 4.31–10.16)

## 2022-05-18 PROCEDURE — 99217 PR OBSERVATION CARE DISCHARGE MANAGEMENT: CPT | Performed by: INTERNAL MEDICINE

## 2022-05-18 PROCEDURE — 85025 COMPLETE CBC W/AUTO DIFF WBC: CPT | Performed by: INTERNAL MEDICINE

## 2022-05-18 PROCEDURE — 93306 TTE W/DOPPLER COMPLETE: CPT

## 2022-05-18 PROCEDURE — 93306 TTE W/DOPPLER COMPLETE: CPT | Performed by: INTERNAL MEDICINE

## 2022-05-18 PROCEDURE — 80048 BASIC METABOLIC PNL TOTAL CA: CPT | Performed by: INTERNAL MEDICINE

## 2022-05-18 RX ORDER — LISINOPRIL 10 MG/1
10 TABLET ORAL
Status: DISCONTINUED | OUTPATIENT
Start: 2022-05-18 | End: 2022-05-18 | Stop reason: HOSPADM

## 2022-05-18 RX ADMIN — ENOXAPARIN SODIUM 40 MG: 40 INJECTION SUBCUTANEOUS at 08:39

## 2022-05-18 NOTE — ASSESSMENT & PLAN NOTE
Resolved  POA, Sodium of 135 -Mild hyponatremia  Suspect secondary to dehydration  Encourage adequate oral hydration

## 2022-05-18 NOTE — DISCHARGE SUMMARY
[facility]  Discharge- Gunnar Garcia 1955, 73 y o  female MRN: [id number]  Unit/Bed#: -01 Encounter: [id number]  Primary Care Provider: No primary care provider on file  Date and time admitted to hospital: 5/17/2022 10:38 AM    * Chest pain  Assessment & Plan  Reports substernal chest pain  Per patient, this is the 2nd episode   Nonexertional  Radiates to both arms and back  Family history of heart attacks in mother  Grade 2 systolic murmur in the RUSB otherwise normal  EKG normal on presentation  CTA negative for dissection/aneurysm  Troponin x1 negative  Will admit for observation and telemetry  2D echo obtained showed normal EF  Advised follow-up with PCP regarding formal read    Hyponatremia  Assessment & Plan  Resolved  POA, Sodium of 135 -Mild hyponatremia  Suspect secondary to dehydration  Encourage adequate oral hydration  Hypertension  Assessment & Plan  BP of 130/90  Continue lisinopril 10 mg daily    Anxiety and depression  Assessment & Plan  Stable  On escitalopram 200 mg qHS PRN and Quetiapine 400 mg qHS  Continue quetiapine 400 mg for now  Continue outpatient follow-up with primary psychiatrist     Hyperlipidemia  Assessment & Plan  Continue home Crestor 20 mg      Discharging Resident Physician: Nunu Brown MD  Attending: Carmine Mehta MD  PCP: No primary care provider on file    Admission Date: 5/17/2022  Admission Date:   Admission Orders (From admission, onward)     Ordered        05/17/22 1252  Place in Observation  Once                      Discharge Date: 05/18/22    Disposition:     Home    Reason for Admission:  Chest pain rule out    Consultations During Hospital Stay:  · None    Procedures Performed:     Orders Placed This Encounter   Procedures    ED ECG Documentation Only       Diagnosis:    Medical Problems             Resolved Problems  Date Reviewed: 5/18/2022   None                 Principal Problem:    Chest pain  Active Problems:    Hyperlipidemia    Anxiety and depression    Hypertension    Hyponatremia      Significant Findings / Test Results:     CTA chest abdomen pelvis w wo contrast (Dissection)   Final Result by Johny Marti MD (05/17 6523)      No acute abnormality identified in the chest abdomen or pelvis  Vascular structures are unremarkable  Workstation performed: [id number]         X-ray chest 1 view portable   Final Result by Sunil Castro MD (05/17 9230)      No acute cardiopulmonary disease  Workstation performed: [id number]         ·     Incidental Findings:   · None     Test Results Pending at Discharge (will require follow up): · None     Outpatient Tests Requested:  · None    Complications:  None    Hospital Course:     Rosy Colin is a 73 y o  female patient who originally presented to the hospital on 5/17/2022 due to chest pain  CTA chest abdomen and pelvis negative for any dissection  She was monitored on telemetry with no telemetric event  Troponin x3 when negative  She is advised follow-up with her PCP regarding recent hospitalization  At the time of discharge, patient does appear hemodynamically and clinically stable  Condition at Discharge: stable     Discharge Day Visit / Exam:     Subjective:  Patient seen and examined at bedside  She reports having some lightheadness during the night  She was monitored on telemetry  There was no telemetric event noted  Vitals: Blood Pressure: 121/77 (05/18/22 0750)  Pulse: 77 (05/18/22 0750)  Temperature: 98 1 °F (36 7 °C) (05/18/22 0750)  Temp Source: Oral (05/17/22 1901)  Respirations: 18 (05/18/22 0750)  Height: 5' (152 4 cm) (05/17/22 1102)  Weight - Scale: 78 5 kg (173 lb) (05/17/22 1102)  SpO2: 96 % (05/18/22 0750)  Exam:   Physical Exam  Vitals reviewed  Constitutional:       Appearance: Normal appearance  HENT:      Head: Normocephalic and atraumatic        Mouth/Throat: Mouth: Mucous membranes are moist    Eyes:      Pupils: Pupils are equal, round, and reactive to light  Cardiovascular:      Rate and Rhythm: Normal rate and regular rhythm  Pulses: Normal pulses  Heart sounds: Normal heart sounds  Pulmonary:      Effort: Pulmonary effort is normal  No respiratory distress  Breath sounds: Normal breath sounds  No stridor  No wheezing  Abdominal:      General: Bowel sounds are normal  There is no distension  Palpations: Abdomen is soft  There is no mass  Tenderness: There is no abdominal tenderness  Musculoskeletal:         General: No swelling or tenderness  Normal range of motion  Cervical back: Normal range of motion and neck supple  Skin:     General: Skin is warm  Neurological:      Mental Status: She is alert and oriented to person, place, and time  Mental status is at baseline  Psychiatric:         Mood and Affect: Mood normal          Discussion with Family:  Talked to patient/friend at bedside  All questions/concerns were answered  They agrees with the plan  Discharge instructions/Information to patient and family:   See after visit summary for information provided to patient and family  Provisions for Follow-Up Care:  See after visit summary for information related to follow-up care and any pertinent home health orders  Planned Readmission:  No     Discharge Medications:  See after visit summary for reconciled discharge medications provided to patient and family  Discharge Statement :  I spent 25 minutes discharging the patient  This time was spent on the day of discharge  I had direct contact with the patient on the day of discharge  Additional documentation is required if more than 30 minutes were spent on discharge           ** Please Note: This note has been constructed using a voice recognition system **

## 2022-05-18 NOTE — PLAN OF CARE
Problem: Potential for Falls  Goal: Patient will remain free of falls  Description: INTERVENTIONS:  - Educate patient/family on patient safety including physical limitations  - Instruct patient to call for assistance with activity   - Consult OT/PT to assist with strengthening/mobility   - Keep Call bell within reach  - Keep bed low and locked with side rails adjusted as appropriate  - Keep care items and personal belongings within reach  - Initiate and maintain comfort rounds  - Make Fall Risk Sign visible to staff  - Offer Toileting every 2 Hours, in advance of need  - Initiate/Maintain bed alarm  - Obtain necessary fall risk management equipment  - Apply yellow socks and bracelet for high fall risk patients  - Consider moving patient to room near nurses station  Outcome: Progressing     Problem: PAIN - ADULT  Goal: Verbalizes/displays adequate comfort level or baseline comfort level  Description: Interventions:  - Encourage patient to monitor pain and request assistance  - Assess pain using appropriate pain scale  - Administer analgesics based on type and severity of pain and evaluate response  - Implement non-pharmacological measures as appropriate and evaluate response  - Consider cultural and social influences on pain and pain management  - Notify physician/advanced practitioner if interventions unsuccessful or patient reports new pain  Outcome: Progressing     Problem: INFECTION - ADULT  Goal: Absence or prevention of progression during hospitalization  Description: INTERVENTIONS:  - Assess and monitor for signs and symptoms of infection  - Monitor lab/diagnostic results  - Monitor all insertion sites, i e  indwelling lines, tubes, and drains  - Monitor endotracheal if appropriate and nasal secretions for changes in amount and color  - [place] appropriate cooling/warming therapies per order  - Administer medications as ordered  - Instruct and encourage patient and family to use good hand hygiene technique  - Identify and instruct in appropriate isolation precautions for identified infection/condition  Outcome: Progressing  Goal: Absence of fever/infection during neutropenic period  Description: INTERVENTIONS:  - Monitor WBC    Outcome: Progressing     Problem: SAFETY ADULT  Goal: Patient will remain free of falls  Description: INTERVENTIONS:  - Educate patient/family on patient safety including physical limitations  - Instruct patient to call for assistance with activity   - Consult OT/PT to assist with strengthening/mobility   - Keep Call bell within reach  - Keep bed low and locked with side rails adjusted as appropriate  - Keep care items and personal belongings within reach  - Initiate and maintain comfort rounds  - Make Fall Risk Sign visible to staff  - Offer Toileting every 2 Hours, in advance of need  - Initiate/Maintain bed alarm  - Obtain necessary fall risk management equipment  - Apply yellow socks and bracelet for high fall risk patients  - Consider moving patient to room near nurses station  Outcome: Progressing  Goal: Maintain or return to baseline ADL function  Description: INTERVENTIONS:  -  Assess patient's ability to carry out ADLs; assess patient's baseline for ADL function and identify physical deficits which impact ability to perform ADLs (bathing, care of mouth/teeth, toileting, grooming, dressing, etc )  - Assess/evaluate cause of self-care deficits   - Assess range of motion  - Assess patient's mobility; develop plan if impaired  - Assess patient's need for assistive devices and provide as appropriate  - Encourage maximum independence but intervene and supervise when necessary  - Involve family in performance of ADLs  - Assess for home care needs following discharge   - Consider OT consult to assist with ADL evaluation and planning for discharge  - Provide patient education as appropriate  Outcome: Progressing  Goal: Maintains/Returns to pre admission functional level  Description: INTERVENTIONS:  - Perform BMAT or MOVE assessment daily    - Set and communicate daily mobility goal to care team and patient/family/caregiver  - Collaborate with rehabilitation services on mobility goals if consulted  - Perform Range of Motion 3 times a day  - Reposition patient every 2 hours  - Dangle patient 3 times a day  - Stand patient 3 times a day  - Ambulate patient 3 times a day  - Out of bed to chair 3 times a day   - Out of bed for meals 3 times a day  - Out of bed for toileting  - Record patient progress and toleration of activity level   Outcome: Progressing     Problem: DISCHARGE PLANNING  Goal: Discharge to home or other facility with appropriate resources  Description: INTERVENTIONS:  - Identify barriers to discharge w/patient and caregiver  - Arrange for needed discharge resources and transportation as appropriate  - Identify discharge learning needs (meds, wound care, etc )  - Arrange for interpretive services to assist at discharge as needed  - Refer to Case Management Department for coordinating discharge planning if the patient needs post-hospital services based on physician/advanced practitioner order or complex needs related to functional status, cognitive ability, or social support system  Outcome: Progressing     Problem: Knowledge Deficit  Goal: Patient/family/caregiver demonstrates understanding of disease process, treatment plan, medications, and discharge instructions  Description: Complete learning assessment and assess knowledge base    Interventions:  - Provide teaching at level of understanding  - Provide teaching via preferred learning methods  Outcome: Progressing

## 2022-05-18 NOTE — PLAN OF CARE
Problem: Potential for Falls  Goal: Patient will remain free of falls  Description: INTERVENTIONS:  - Educate patient/family on patient safety including physical limitations  - Instruct patient to call for assistance with activity   - Consult OT/PT to assist with strengthening/mobility   - Keep Call bell within reach  - Keep bed low and locked with side rails adjusted as appropriate  - Keep care items and personal belongings within reach  - Initiate and maintain comfort rounds  - Make Fall Risk Sign visible to staff  - Offer Toileting every 2 Hours, in advance of need  - Initiate/Maintain alarm  - Obtain necessary fall risk management equipment:   - Apply yellow socks and bracelet for high fall risk patients  - Consider moving patient to room near nurses station  Outcome: Progressing     Problem: PAIN - ADULT  Goal: Verbalizes/displays adequate comfort level or baseline comfort level  Description: Interventions:  - Encourage patient to monitor pain and request assistance  - Assess pain using appropriate pain scale  - Administer analgesics based on type and severity of pain and evaluate response  - Implement non-pharmacological measures as appropriate and evaluate response  - Consider cultural and social influences on pain and pain management  - Notify physician/advanced practitioner if interventions unsuccessful or patient reports new pain  Outcome: Progressing     Problem: INFECTION - ADULT  Goal: Absence or prevention of progression during hospitalization  Description: INTERVENTIONS:  - Assess and monitor for signs and symptoms of infection  - Monitor lab/diagnostic results  - Monitor all insertion sites, i e  indwelling lines, tubes, and drains  - Monitor endotracheal if appropriate and nasal secretions for changes in amount and color  - [place] appropriate cooling/warming therapies per order  - Administer medications as ordered  - Instruct and encourage patient and family to use good hand hygiene technique  - Identify and instruct in appropriate isolation precautions for identified infection/condition  Outcome: Progressing  Goal: Absence of fever/infection during neutropenic period  Description: INTERVENTIONS:  - Monitor WBC    Outcome: Progressing     Problem: SAFETY ADULT  Goal: Patient will remain free of falls  Description: INTERVENTIONS:  - Educate patient/family on patient safety including physical limitations  - Instruct patient to call for assistance with activity   - Consult OT/PT to assist with strengthening/mobility   - Keep Call bell within reach  - Keep bed low and locked with side rails adjusted as appropriate  - Keep care items and personal belongings within reach  - Initiate and maintain comfort rounds  - Make Fall Risk Sign visible to staff  - Offer Toileting every 2 Hours, in advance of need  - Initiate/Maintain alarm  - Obtain necessary fall risk management equipment:   - Apply yellow socks and bracelet for high fall risk patients  - Consider moving patient to room near nurses station  Outcome: Progressing  Goal: Maintain or return to baseline ADL function  Description: INTERVENTIONS:  -  Assess patient's ability to carry out ADLs; assess patient's baseline for ADL function and identify physical deficits which impact ability to perform ADLs (bathing, care of mouth/teeth, toileting, grooming, dressing, etc )  - Assess/evaluate cause of self-care deficits   - Assess range of motion  - Assess patient's mobility; develop plan if impaired  - Assess patient's need for assistive devices and provide as appropriate  - Encourage maximum independence but intervene and supervise when necessary  - Involve family in performance of ADLs  - Assess for home care needs following discharge   - Consider OT consult to assist with ADL evaluation and planning for discharge  - Provide patient education as appropriate  Outcome: Progressing  Goal: Maintains/Returns to pre admission functional level  Description: INTERVENTIONS:  - Perform BMAT or MOVE assessment daily    - Set and communicate daily mobility goal to care team and patient/family/caregiver  - Collaborate with rehabilitation services on mobility goals if consulted  - Perform Range of Motion  times a day  - Reposition patient every  hours  - Dangle patient  times a day  - Stand patient  times a day  - Ambulate patient  times a day  - Out of bed to chair  times a day   - Out of bed for meals  times a day  - Out of bed for toileting  - Record patient progress and toleration of activity level   Outcome: Progressing     Problem: DISCHARGE PLANNING  Goal: Discharge to home or other facility with appropriate resources  Description: INTERVENTIONS:  - Identify barriers to discharge w/patient and caregiver  - Arrange for needed discharge resources and transportation as appropriate  - Identify discharge learning needs (meds, wound care, etc )  - Arrange for interpretive services to assist at discharge as needed  - Refer to Case Management Department for coordinating discharge planning if the patient needs post-hospital services based on physician/advanced practitioner order or complex needs related to functional status, cognitive ability, or social support system  Outcome: Progressing     Problem: Knowledge Deficit  Goal: Patient/family/caregiver demonstrates understanding of disease process, treatment plan, medications, and discharge instructions  Description: Complete learning assessment and assess knowledge base    Interventions:  - Provide teaching at level of understanding  - Provide teaching via preferred learning methods  Outcome: Progressing

## 2022-05-18 NOTE — ASSESSMENT & PLAN NOTE
Reports substernal chest pain  Per patient, this is the 2nd episode   Nonexertional  Radiates to both arms and back  Family history of heart attacks in mother  Grade 2 systolic murmur in the RUSB otherwise normal  EKG normal on presentation  CTA negative for dissection/aneurysm  Troponin x1 negative  Will admit for observation and telemetry  2D echo obtained showed normal EF    Advised follow-up with PCP regarding formal read

## 2022-12-01 ENCOUNTER — HOSPITAL ENCOUNTER (OUTPATIENT)
Dept: HOSPITAL 53 - M LAB REF | Age: 67
End: 2022-12-01
Attending: PHYSICIAN ASSISTANT
Payer: MEDICARE

## 2022-12-01 DIAGNOSIS — B34.9: Primary | ICD-10-CM

## 2023-02-27 ENCOUNTER — HOSPITAL ENCOUNTER (OUTPATIENT)
Dept: HOSPITAL 53 - M WHC | Age: 68
End: 2023-02-27
Attending: NURSE PRACTITIONER
Payer: OTHER GOVERNMENT

## 2023-02-27 DIAGNOSIS — Z12.31: Primary | ICD-10-CM

## 2023-05-12 ENCOUNTER — HOSPITAL ENCOUNTER (OUTPATIENT)
Dept: HOSPITAL 53 - M RAD | Age: 68
End: 2023-05-12
Attending: NURSE PRACTITIONER
Payer: OTHER GOVERNMENT

## 2023-05-12 DIAGNOSIS — Z87.891: ICD-10-CM

## 2023-05-12 DIAGNOSIS — Z12.2: Primary | ICD-10-CM

## 2023-12-17 ENCOUNTER — HOSPITAL ENCOUNTER (EMERGENCY)
Dept: HOSPITAL 53 - M ED | Age: 68
Discharge: HOME | End: 2023-12-17
Payer: OTHER GOVERNMENT

## 2023-12-17 VITALS — SYSTOLIC BLOOD PRESSURE: 142 MMHG | OXYGEN SATURATION: 97 % | DIASTOLIC BLOOD PRESSURE: 71 MMHG | TEMPERATURE: 98.4 F

## 2023-12-17 DIAGNOSIS — M85.80: ICD-10-CM

## 2023-12-17 DIAGNOSIS — Z79.899: ICD-10-CM

## 2023-12-17 DIAGNOSIS — Z96.643: ICD-10-CM

## 2023-12-17 DIAGNOSIS — I10: ICD-10-CM

## 2023-12-17 DIAGNOSIS — N30.00: Primary | ICD-10-CM

## 2023-12-17 LAB
ALBUMIN SERPL BCG-MCNC: 3.7 G/DL (ref 3.2–5.2)
ALP SERPL-CCNC: 90 U/L (ref 46–116)
ALT SERPL W P-5'-P-CCNC: 34 U/L (ref 7–40)
AST SERPL-CCNC: 21 U/L (ref ?–34)
BASOPHILS # BLD AUTO: 0 10^3/UL (ref 0–0.2)
BASOPHILS NFR BLD AUTO: 0.2 % (ref 0–1)
BILIRUB CONJ SERPL-MCNC: 0.2 MG/DL (ref ?–0.4)
BILIRUB SERPL-MCNC: 0.7 MG/DL (ref 0.3–1.2)
BUN SERPL-MCNC: 17 MG/DL (ref 9–23)
CALCIUM SERPL-MCNC: 9.9 MG/DL (ref 8.3–10.6)
CHLORIDE SERPL-SCNC: 107 MMOL/L (ref 98–107)
CK MB CFR.DF SERPL CALC: 1.33
CK MB SERPL-MCNC: < 1 NG/ML (ref ?–3.6)
CK SERPL-CCNC: 75 U/L (ref 34–145)
CO2 SERPL-SCNC: 18 MMOL/L (ref 20–31)
CREAT SERPL-MCNC: 0.68 MG/DL (ref 0.55–1.3)
EOSINOPHIL # BLD AUTO: 0 10^3/UL (ref 0–0.5)
EOSINOPHIL NFR BLD AUTO: 0.4 % (ref 0–3)
GFR SERPL CREATININE-BSD FRML MDRD: > 60 ML/MIN/{1.73_M2} (ref 45–?)
GLUCOSE SERPL-MCNC: 143 MG/DL (ref 74–106)
HCT VFR BLD AUTO: 40.7 % (ref 36–47)
HGB BLD-MCNC: 14.6 G/DL (ref 12–15.5)
LIPASE SERPL-CCNC: 18 U/L (ref 12–53)
LYMPHOCYTES # BLD AUTO: 0.8 10^3/UL (ref 1.5–5)
LYMPHOCYTES NFR BLD AUTO: 7.4 % (ref 24–44)
MCH RBC QN AUTO: 32.4 PG (ref 27–33)
MCHC RBC AUTO-ENTMCNC: 35.9 G/DL (ref 32–36.5)
MCV RBC AUTO: 90.4 FL (ref 80–96)
MONOCYTES # BLD AUTO: 0.4 10^3/UL (ref 0–0.8)
MONOCYTES NFR BLD AUTO: 3.6 % (ref 2–8)
NEUTROPHILS # BLD AUTO: 8.9 10^3/UL (ref 1.5–8.5)
NEUTROPHILS NFR BLD AUTO: 88 % (ref 36–66)
PLATELET # BLD AUTO: 329 10^3/UL (ref 150–450)
POTASSIUM SERPL-SCNC: 4.3 MMOL/L (ref 3.5–5.1)
PROT SERPL-MCNC: 6.7 G/DL (ref 5.7–8.2)
RBC # BLD AUTO: 4.5 10^6/UL (ref 4–5.4)
SODIUM SERPL-SCNC: 138 MMOL/L (ref 136–145)
WBC # BLD AUTO: 10.2 10^3/UL (ref 4–10)

## 2023-12-17 PROCEDURE — 82553 CREATINE MB FRACTION: CPT

## 2023-12-17 PROCEDURE — 81001 URINALYSIS AUTO W/SCOPE: CPT

## 2023-12-17 PROCEDURE — 84484 ASSAY OF TROPONIN QUANT: CPT

## 2023-12-17 PROCEDURE — 96374 THER/PROPH/DIAG INJ IV PUSH: CPT

## 2023-12-17 PROCEDURE — 93041 RHYTHM ECG TRACING: CPT

## 2023-12-17 PROCEDURE — 96375 TX/PRO/DX INJ NEW DRUG ADDON: CPT

## 2023-12-17 PROCEDURE — 87088 URINE BACTERIA CULTURE: CPT

## 2023-12-17 PROCEDURE — 99285 EMERGENCY DEPT VISIT HI MDM: CPT

## 2023-12-17 PROCEDURE — 80048 BASIC METABOLIC PNL TOTAL CA: CPT

## 2023-12-17 PROCEDURE — 85025 COMPLETE CBC W/AUTO DIFF WBC: CPT

## 2023-12-17 PROCEDURE — 82550 ASSAY OF CK (CPK): CPT

## 2023-12-17 PROCEDURE — 96376 TX/PRO/DX INJ SAME DRUG ADON: CPT

## 2023-12-17 PROCEDURE — 87186 SC STD MICRODIL/AGAR DIL: CPT

## 2023-12-17 PROCEDURE — 80076 HEPATIC FUNCTION PANEL: CPT

## 2023-12-17 PROCEDURE — 83690 ASSAY OF LIPASE: CPT

## 2023-12-17 PROCEDURE — 74177 CT ABD & PELVIS W/CONTRAST: CPT

## 2023-12-17 RX ADMIN — MORPHINE SULFATE PRN MG: 4 INJECTION INTRAVENOUS at 08:36

## 2023-12-17 RX ADMIN — MORPHINE SULFATE PRN MG: 4 INJECTION INTRAVENOUS at 05:06

## 2023-12-18 ENCOUNTER — HOSPITAL ENCOUNTER (EMERGENCY)
Dept: HOSPITAL 53 - M ED | Age: 68
LOS: 1 days | Discharge: HOME | End: 2023-12-19
Payer: MEDICARE

## 2023-12-18 VITALS — BODY MASS INDEX: 27.15 KG/M2 | WEIGHT: 153.22 LBS | HEIGHT: 63 IN

## 2023-12-18 VITALS — TEMPERATURE: 97.3 F

## 2023-12-18 DIAGNOSIS — R10.13: Primary | ICD-10-CM

## 2023-12-18 DIAGNOSIS — Z79.899: ICD-10-CM

## 2023-12-18 DIAGNOSIS — E78.5: ICD-10-CM

## 2023-12-18 DIAGNOSIS — F32.A: ICD-10-CM

## 2023-12-18 DIAGNOSIS — I10: ICD-10-CM

## 2023-12-18 LAB
ALBUMIN SERPL BCG-MCNC: 3.3 G/DL (ref 3.2–5.2)
ALP SERPL-CCNC: 75 U/L (ref 46–116)
ALT SERPL W P-5'-P-CCNC: 28 U/L (ref 7–40)
APTT BLD: 24.9 SECONDS (ref 24.8–34.2)
AST SERPL-CCNC: 20 U/L (ref ?–34)
BASOPHILS # BLD AUTO: 0 10^3/UL (ref 0–0.2)
BASOPHILS NFR BLD AUTO: 0.2 % (ref 0–1)
BILIRUB CONJ SERPL-MCNC: 0.1 MG/DL (ref ?–0.4)
BILIRUB SERPL-MCNC: 0.3 MG/DL (ref 0.3–1.2)
BUN SERPL-MCNC: 12 MG/DL (ref 9–23)
CALCIUM SERPL-MCNC: 9.9 MG/DL (ref 8.3–10.6)
CHLORIDE SERPL-SCNC: 107 MMOL/L (ref 98–107)
CO2 SERPL-SCNC: 22 MMOL/L (ref 20–31)
CREAT SERPL-MCNC: 0.75 MG/DL (ref 0.55–1.3)
EOSINOPHIL # BLD AUTO: 0.1 10^3/UL (ref 0–0.5)
EOSINOPHIL NFR BLD AUTO: 0.6 % (ref 0–3)
GFR SERPL CREATININE-BSD FRML MDRD: > 60 ML/MIN/{1.73_M2} (ref 45–?)
GLUCOSE SERPL-MCNC: 126 MG/DL (ref 74–106)
HCT VFR BLD AUTO: 37.6 % (ref 36–47)
HGB BLD-MCNC: 13 G/DL (ref 12–15.5)
INR PPP: 0.98
LIPASE SERPL-CCNC: 24 U/L (ref 12–53)
LYMPHOCYTES # BLD AUTO: 1.8 10^3/UL (ref 1.5–5)
LYMPHOCYTES NFR BLD AUTO: 15.6 % (ref 24–44)
MCH RBC QN AUTO: 32.3 PG (ref 27–33)
MCHC RBC AUTO-ENTMCNC: 34.6 G/DL (ref 32–36.5)
MCV RBC AUTO: 93.3 FL (ref 80–96)
MONOCYTES # BLD AUTO: 0.8 10^3/UL (ref 0–0.8)
MONOCYTES NFR BLD AUTO: 7.5 % (ref 2–8)
NEUTROPHILS # BLD AUTO: 8.5 10^3/UL (ref 1.5–8.5)
NEUTROPHILS NFR BLD AUTO: 75.7 % (ref 36–66)
PLATELET # BLD AUTO: 266 10^3/UL (ref 150–450)
POTASSIUM SERPL-SCNC: 4.2 MMOL/L (ref 3.5–5.1)
PROT SERPL-MCNC: 6.2 G/DL (ref 5.7–8.2)
PROTHROMBIN TIME: 12.8 SECONDS (ref 12.5–14.5)
RBC # BLD AUTO: 4.03 10^6/UL (ref 4–5.4)
SODIUM SERPL-SCNC: 140 MMOL/L (ref 136–145)
WBC # BLD AUTO: 11.3 10^3/UL (ref 4–10)

## 2023-12-18 PROCEDURE — 93041 RHYTHM ECG TRACING: CPT

## 2023-12-18 PROCEDURE — 85730 THROMBOPLASTIN TIME PARTIAL: CPT

## 2023-12-18 PROCEDURE — 96374 THER/PROPH/DIAG INJ IV PUSH: CPT

## 2023-12-18 PROCEDURE — 71275 CT ANGIOGRAPHY CHEST: CPT

## 2023-12-18 PROCEDURE — 82553 CREATINE MB FRACTION: CPT

## 2023-12-18 PROCEDURE — 83690 ASSAY OF LIPASE: CPT

## 2023-12-18 PROCEDURE — 96375 TX/PRO/DX INJ NEW DRUG ADDON: CPT

## 2023-12-18 PROCEDURE — 84484 ASSAY OF TROPONIN QUANT: CPT

## 2023-12-18 PROCEDURE — 85610 PROTHROMBIN TIME: CPT

## 2023-12-18 PROCEDURE — 85025 COMPLETE CBC W/AUTO DIFF WBC: CPT

## 2023-12-18 PROCEDURE — 80048 BASIC METABOLIC PNL TOTAL CA: CPT

## 2023-12-18 PROCEDURE — 76705 ECHO EXAM OF ABDOMEN: CPT

## 2023-12-18 PROCEDURE — 99285 EMERGENCY DEPT VISIT HI MDM: CPT

## 2023-12-18 PROCEDURE — 93005 ELECTROCARDIOGRAM TRACING: CPT

## 2023-12-18 PROCEDURE — 82550 ASSAY OF CK (CPK): CPT

## 2023-12-18 PROCEDURE — 80076 HEPATIC FUNCTION PANEL: CPT

## 2023-12-19 VITALS — DIASTOLIC BLOOD PRESSURE: 76 MMHG | OXYGEN SATURATION: 95 % | SYSTOLIC BLOOD PRESSURE: 167 MMHG

## 2023-12-19 LAB
CK MB CFR.DF SERPL CALC: 1.49
CK MB SERPL-MCNC: < 1 NG/ML (ref ?–3.6)
CK SERPL-CCNC: 67 U/L (ref 34–145)

## 2024-02-28 ENCOUNTER — HOSPITAL ENCOUNTER (OUTPATIENT)
Dept: HOSPITAL 53 - M WHC | Age: 69
End: 2024-02-28
Attending: NURSE PRACTITIONER
Payer: MEDICARE

## 2024-02-28 DIAGNOSIS — Z12.31: Primary | ICD-10-CM

## 2024-03-07 ENCOUNTER — HOSPITAL ENCOUNTER (OUTPATIENT)
Dept: HOSPITAL 53 - M OPP | Age: 69
Discharge: HOME | End: 2024-03-07
Attending: INTERNAL MEDICINE
Payer: OTHER GOVERNMENT

## 2024-03-07 VITALS — BODY MASS INDEX: 25.27 KG/M2 | HEIGHT: 64 IN | WEIGHT: 148 LBS

## 2024-03-07 VITALS — SYSTOLIC BLOOD PRESSURE: 144 MMHG | DIASTOLIC BLOOD PRESSURE: 72 MMHG | OXYGEN SATURATION: 97 %

## 2024-03-07 VITALS — TEMPERATURE: 97.5 F

## 2024-03-07 DIAGNOSIS — Z79.02: ICD-10-CM

## 2024-03-07 DIAGNOSIS — K64.0: Primary | ICD-10-CM

## 2024-03-07 DIAGNOSIS — Z79.899: ICD-10-CM

## 2024-03-07 DIAGNOSIS — K31.89: ICD-10-CM

## 2024-03-07 DIAGNOSIS — R13.10: ICD-10-CM

## 2024-03-07 DIAGNOSIS — Z79.891: ICD-10-CM

## 2024-03-07 DIAGNOSIS — Z79.51: ICD-10-CM

## 2024-03-07 DIAGNOSIS — R10.13: ICD-10-CM

## 2024-03-07 DIAGNOSIS — K57.30: ICD-10-CM

## 2024-03-07 DIAGNOSIS — R63.4: ICD-10-CM

## 2024-03-07 PROCEDURE — 88305 TISSUE EXAM BY PATHOLOGIST: CPT

## 2024-03-07 PROCEDURE — 43239 EGD BIOPSY SINGLE/MULTIPLE: CPT

## 2024-03-07 PROCEDURE — 43249 ESOPH EGD DILATION <30 MM: CPT

## 2024-03-07 PROCEDURE — 45378 DIAGNOSTIC COLONOSCOPY: CPT

## 2024-03-07 RX ADMIN — SODIUM CHLORIDE ONE MLS/HR: 9 INJECTION, SOLUTION INTRAVENOUS at 09:05

## 2024-12-06 ENCOUNTER — HOSPITAL ENCOUNTER (OUTPATIENT)
Dept: HOSPITAL 53 - M PLAIMG | Age: 69
End: 2024-12-06
Attending: INTERNAL MEDICINE
Payer: OTHER GOVERNMENT

## 2024-12-06 DIAGNOSIS — K86.9: Primary | ICD-10-CM

## 2024-12-06 DIAGNOSIS — Z90.49: ICD-10-CM

## 2024-12-06 DIAGNOSIS — K83.8: ICD-10-CM

## 2024-12-06 PROCEDURE — 74183 MRI ABD W/O CNTR FLWD CNTR: CPT

## 2025-03-03 ENCOUNTER — HOSPITAL ENCOUNTER (OUTPATIENT)
Dept: HOSPITAL 53 - M WHC | Age: 70
End: 2025-03-03
Attending: NURSE PRACTITIONER
Payer: OTHER GOVERNMENT

## 2025-03-03 DIAGNOSIS — R92.333: ICD-10-CM

## 2025-03-03 DIAGNOSIS — Z12.31: Primary | ICD-10-CM
